# Patient Record
Sex: MALE | Employment: FULL TIME | URBAN - METROPOLITAN AREA
[De-identification: names, ages, dates, MRNs, and addresses within clinical notes are randomized per-mention and may not be internally consistent; named-entity substitution may affect disease eponyms.]

---

## 2017-07-11 ENCOUNTER — OFFICE VISIT (OUTPATIENT)
Dept: URGENT CARE | Facility: CLINIC | Age: 52
End: 2017-07-11
Payer: COMMERCIAL

## 2017-07-11 ENCOUNTER — APPOINTMENT (OUTPATIENT)
Dept: RADIOLOGY | Facility: IMAGING CENTER | Age: 52
End: 2017-07-11
Attending: FAMILY MEDICINE
Payer: COMMERCIAL

## 2017-07-11 VITALS
SYSTOLIC BLOOD PRESSURE: 124 MMHG | TEMPERATURE: 97.5 F | OXYGEN SATURATION: 95 % | BODY MASS INDEX: 33.36 KG/M2 | HEIGHT: 70 IN | DIASTOLIC BLOOD PRESSURE: 80 MMHG | HEART RATE: 78 BPM | WEIGHT: 233 LBS | RESPIRATION RATE: 18 BRPM

## 2017-07-11 DIAGNOSIS — S89.91XA RIGHT KNEE INJURY, INITIAL ENCOUNTER: ICD-10-CM

## 2017-07-11 DIAGNOSIS — S80.01XA CONTUSION OF RIGHT KNEE, INITIAL ENCOUNTER: ICD-10-CM

## 2017-07-11 PROCEDURE — 99203 OFFICE O/P NEW LOW 30 MIN: CPT | Performed by: FAMILY MEDICINE

## 2017-07-11 PROCEDURE — 73562 X-RAY EXAM OF KNEE 3: CPT | Mod: TC,RT | Performed by: FAMILY MEDICINE

## 2017-07-11 ASSESSMENT — PAIN SCALES - GENERAL: PAINLEVEL: 6=MODERATE PAIN

## 2017-07-12 NOTE — PROGRESS NOTES
"Chief Complaint:    Chief Complaint   Patient presents with   • Knee Pain     Right knee, fell on the 6th, felt like the knee cap broke or something, swelling        History of Present Illness:    This is a new problem. Has pain and swelling in right knee. On 7/6/17, fell at [employer] onto right knee - hit very hard surface. Right knee swelled up significantly. Overall, problem is getting better, although there is still pain and swelling. Using Ibuprofen. Wants to check if he broke his patella.      Review of Systems:    Constitutional: Negative for fever, chills, and diaphoresis.   Musculoskeletal: See HPI.  Skin: Negative for rash and itching.     Past Medical History:    Past Medical History   Diagnosis Date   • Adult ADHD 8/16/2013       Past Surgical History:    Past Surgical History   Procedure Laterality Date   • Nerve exploration       bilateral forearms   • Tonsillectomy         Social History:    Social History     Social History   • Marital Status:      Spouse Name: N/A   • Number of Children: N/A   • Years of Education: N/A     Occupational History   • Not on file.     Social History Main Topics   • Smoking status: Never Smoker    • Smokeless tobacco: Never Used   • Alcohol Use: 1.0 oz/week     2 Cans of beer per week   • Drug Use: No   • Sexual Activity:     Partners: Female     Other Topics Concern   • Not on file     Social History Narrative       Family History:    History reviewed. No pertinent family history.    Medications:    No current outpatient prescriptions on file prior to visit.     No current facility-administered medications on file prior to visit.       Allergies:    Allergies   Allergen Reactions   • Contrast Media With Iodine [Iodine] Anaphylaxis       Vitals:    Filed Vitals:    07/11/17 1828   BP: 124/80   Pulse: 78   Temp: 36.4 °C (97.5 °F)   Resp: 18   Height: 1.778 m (5' 10\")   Weight: 105.688 kg (233 lb)   SpO2: 95%       Physical Exam:    Constitutional: Vital signs reviewed. " Appears well-developed and well-nourished. No acute distress.   Pulmonary/Chest: Respirations non-labored.  Musculoskeletal: Antalgic gait. Right knee with mild swelling compared to left and mildly decreased range of motion. No muscular atrophy or weakness.  Neurological: Alert and oriented to person, place, and time. Muscle tone normal. Coordination normal. Light touch and sensation normal.   Skin: No rashes or lesions. Warm, dry, normal turgor.  Psychiatric: Normal mood and affect. Behavior is normal. Judgment and thought content normal.     Diagnostics:     DX-KNEE 3 VIEWS (Order #[id number]) on 7/11/17       Narrative        7/11/2017 6:47 PM    HISTORY/REASON FOR EXAM:  Pain/Deformity Following Trauma.      TECHNIQUE/EXAM DESCRIPTION AND NUMBER OF VIEWS:  3 views of the RIGHT knee.    COMPARISON: None    FINDINGS:  Anterior soft tissue swelling.  No acute fracture identified.  No definite knee joint effusion.  Minimal degenerative changes.         Impression        Soft tissue swelling. No acute fracture identified.     Rad report reviewed with him and copy of report to him.      Assessment / Plan:    1. Right knee injury, initial encounter  - DX-KNEE 3 VIEWS RIGHT; Future    2. Contusion of right knee, initial encounter      Discussed with him DDX and management options.    Overall he is getting better, just wanted to check for fracture. X-rays were negative for fracture.    Recommended relative rest.    May use ice to right knee prn pain and swelling.    Declines ACE wrap - says he has one at home that he may use prn pain and swelling.    May continue over-the-counter Ibuprofen (Motrin or Advil) as needed for pain and swelling for anti-inflammatory effect.    Follow-up with PCP or urgent care if getting worse or not continuing to get better with time above.

## 2021-09-29 ENCOUNTER — HOSPITAL ENCOUNTER (OUTPATIENT)
Dept: HOSPITAL 8 - CVU | Age: 56
Discharge: HOME | End: 2021-09-29
Attending: INTERNAL MEDICINE
Payer: COMMERCIAL

## 2021-09-29 DIAGNOSIS — R07.89: ICD-10-CM

## 2021-09-29 DIAGNOSIS — I08.8: Primary | ICD-10-CM
